# Patient Record
Sex: FEMALE | Race: WHITE | Employment: FULL TIME | URBAN - NONMETROPOLITAN AREA
[De-identification: names, ages, dates, MRNs, and addresses within clinical notes are randomized per-mention and may not be internally consistent; named-entity substitution may affect disease eponyms.]

---

## 2018-02-06 ENCOUNTER — OFFICE VISIT (OUTPATIENT)
Dept: FAMILY MEDICINE CLINIC | Age: 38
End: 2018-02-06
Payer: COMMERCIAL

## 2018-02-06 VITALS
OXYGEN SATURATION: 98 % | RESPIRATION RATE: 32 BRPM | HEART RATE: 64 BPM | SYSTOLIC BLOOD PRESSURE: 136 MMHG | DIASTOLIC BLOOD PRESSURE: 84 MMHG | BODY MASS INDEX: 47.09 KG/M2 | HEIGHT: 66 IN | WEIGHT: 293 LBS

## 2018-02-06 VITALS
DIASTOLIC BLOOD PRESSURE: 90 MMHG | WEIGHT: 293 LBS | HEART RATE: 68 BPM | SYSTOLIC BLOOD PRESSURE: 120 MMHG | HEIGHT: 67 IN | BODY MASS INDEX: 45.99 KG/M2

## 2018-02-06 DIAGNOSIS — M54.50 ACUTE LEFT-SIDED LOW BACK PAIN WITHOUT SCIATICA: Primary | ICD-10-CM

## 2018-02-06 DIAGNOSIS — M62.838 MUSCLE SPASM: ICD-10-CM

## 2018-02-06 DIAGNOSIS — N97.9 INFERTILITY, FEMALE: ICD-10-CM

## 2018-02-06 DIAGNOSIS — F39 MOOD DISORDER (HCC): ICD-10-CM

## 2018-02-06 DIAGNOSIS — E78.6 HDL DEFICIENCY: ICD-10-CM

## 2018-02-06 DIAGNOSIS — G44.89 HEADACHE SYNDROME: ICD-10-CM

## 2018-02-06 PROCEDURE — 99213 OFFICE O/P EST LOW 20 MIN: CPT | Performed by: NURSE PRACTITIONER

## 2018-02-06 RX ORDER — HYDROCHLOROTHIAZIDE 25 MG/1
25 TABLET ORAL DAILY
COMMUNITY
End: 2018-10-22 | Stop reason: SDUPTHER

## 2018-02-06 RX ORDER — TOPIRAMATE 50 MG/1
50 TABLET, FILM COATED ORAL 2 TIMES DAILY
COMMUNITY
End: 2020-04-20

## 2018-02-06 RX ORDER — BUPROPION HYDROCHLORIDE 300 MG/1
300 TABLET ORAL DAILY
COMMUNITY
Start: 2018-01-31 | End: 2018-07-23 | Stop reason: SDUPTHER

## 2018-02-06 RX ORDER — GABAPENTIN 600 MG/1
600 TABLET ORAL 3 TIMES DAILY
COMMUNITY
End: 2018-07-23 | Stop reason: ALTCHOICE

## 2018-02-06 RX ORDER — IBUPROFEN 800 MG/1
800 TABLET ORAL EVERY 8 HOURS PRN
COMMUNITY
End: 2018-07-23 | Stop reason: SDUPTHER

## 2018-02-06 RX ORDER — TIZANIDINE HYDROCHLORIDE 4 MG/1
4 CAPSULE, GELATIN COATED ORAL 3 TIMES DAILY PRN
Qty: 90 CAPSULE | Refills: 0 | Status: SHIPPED | OUTPATIENT
Start: 2018-02-06 | End: 2018-10-01 | Stop reason: SDUPTHER

## 2018-02-06 ASSESSMENT — ENCOUNTER SYMPTOMS
ABDOMINAL PAIN: 0
VOMITING: 0
NAUSEA: 1

## 2018-02-06 NOTE — LETTER
[address] [place]  Phone: [phone]  Fax: [phone]    Lizz Iqbal        February 6, 2018     Patient: Priyanka Hansen   YOB: 1980   Date of Visit: 2/6/2018       To Whom it May Concern:    Priyanka Hansen was seen in my clinic on 2/6/2018. She is to be off work 2/6/2018 and 2/7/2018. If you have any questions or concerns, please don't hesitate to call.     Sincerely,         Hodan Mathur, GURU

## 2018-02-09 ENCOUNTER — TELEPHONE (OUTPATIENT)
Dept: FAMILY MEDICINE CLINIC | Age: 38
End: 2018-02-09

## 2018-02-13 ASSESSMENT — ENCOUNTER SYMPTOMS
DIARRHEA: 0
COUGH: 0
BACK PAIN: 1
SHORTNESS OF BREATH: 0
CONSTIPATION: 0
WHEEZING: 0

## 2018-03-23 ENCOUNTER — OFFICE VISIT (OUTPATIENT)
Dept: FAMILY MEDICINE CLINIC | Age: 38
End: 2018-03-23
Payer: COMMERCIAL

## 2018-03-23 VITALS
BODY MASS INDEX: 52.94 KG/M2 | HEART RATE: 72 BPM | TEMPERATURE: 98.1 F | DIASTOLIC BLOOD PRESSURE: 100 MMHG | OXYGEN SATURATION: 96 % | SYSTOLIC BLOOD PRESSURE: 130 MMHG | WEIGHT: 293 LBS

## 2018-03-23 DIAGNOSIS — J02.0 STREP THROAT: Primary | ICD-10-CM

## 2018-03-23 LAB — S PYO AG THROAT QL: POSITIVE

## 2018-03-23 PROCEDURE — 87880 STREP A ASSAY W/OPTIC: CPT | Performed by: NURSE PRACTITIONER

## 2018-03-23 PROCEDURE — 96372 THER/PROPH/DIAG INJ SC/IM: CPT | Performed by: NURSE PRACTITIONER

## 2018-03-23 PROCEDURE — 99213 OFFICE O/P EST LOW 20 MIN: CPT | Performed by: NURSE PRACTITIONER

## 2018-03-23 ASSESSMENT — ENCOUNTER SYMPTOMS
COUGH: 0
VOMITING: 0
SORE THROAT: 1
NAUSEA: 0
ABDOMINAL PAIN: 0
SWOLLEN GLANDS: 1

## 2018-03-23 NOTE — PATIENT INSTRUCTIONS
Patient Education        Strep Throat: Care Instructions  Your Care Instructions    Strep throat is a bacterial infection that causes sudden, severe sore throat and fever. Strep throat, which is caused by bacteria called streptococcus, is treated with antibiotics. Sometimes a strep test is necessary to tell if the sore throat is caused by strep bacteria. Treatment can help ease symptoms and may prevent future problems. Follow-up care is a key part of your treatment and safety. Be sure to make and go to all appointments, and call your doctor if you are having problems. It's also a good idea to know your test results and keep a list of the medicines you take. How can you care for yourself at home? · Take your antibiotics as directed. Do not stop taking them just because you feel better. You need to take the full course of antibiotics. · Strep throat can spread to others until 24 hours after you begin taking antibiotics. During this time, you should avoid contact with other people at work or home, especially infants and children. Do not sneeze or cough on others, and wash your hands often. Keep your drinking glass and eating utensils separate from those of others, and wash these items well in hot, soapy water. · Gargle with warm salt water at least once each hour to help reduce swelling and make your throat feel better. Use 1 teaspoon of salt mixed in 8 fluid ounces of warm water. · Take an over-the-counter pain medication, such as acetaminophen (Tylenol), ibuprofen (Advil, Motrin), or naproxen (Aleve). Read and follow all instructions on the label. · Try an over-the-counter anesthetic throat spray or throat lozenges, which may help relieve throat pain. · Drink plenty of fluids. Fluids may help soothe an irritated throat. Hot fluids, such as tea or soup, may help your throat feel better. · Eat soft solids and drink plenty of clear liquids.  Flavored ice pops, ice cream, scrambled eggs, sherbet, and gelatin dessert (such as Jell-O) may also soothe the throat. · Get lots of rest.  · Do not smoke, and avoid secondhand smoke. If you need help quitting, talk to your doctor about stop-smoking programs and medicines. These can increase your chances of quitting for good. · Use a vaporizer or humidifier to add moisture to the air in your bedroom. Follow the directions for cleaning the machine. When should you call for help? Call your doctor now or seek immediate medical care if:  ? · You have a new or higher fever. ? · You have a fever with a stiff neck or severe headache. ? · You have new or worse trouble swallowing. ? · Your sore throat gets much worse on one side. ? · Your pain becomes much worse on one side of your throat. ? Watch closely for changes in your health, and be sure to contact your doctor if:  ? · You are not getting better after 2 days (48 hours). ? · You do not get better as expected. Where can you learn more? Go to https://Modanisa.FAST FELT. org and sign in to your Newsgrape account. Enter K625 in the [facility] box to learn more about \"Strep Throat: Care Instructions. \"     If you do not have an account, please click on the \"Sign Up Now\" link. Current as of: May 12, 2017  Content Version: 11.5  © 4078-5374 [employer]. Care instructions adapted under license by [facility] ([facility]). If you have questions about a medical condition or this instruction, always ask your healthcare professional. [place] [address] any warranty or liability for your use of this information. Patient Education          benzathine penicillin and procaine penicillin  Pronunciation:  MURIEL a theen PEN i CHRISTINE in and PRO [place] PEN i CHRISTINE in  Brand:  Bicillin C-R, Bicillin C-R 900/300  What is the most important information I should know about benzathine penicillin and procaine penicillin ? You should not use this medication if you are allergic to penicillin.  Tell your doctor if you have ever had an allergic reaction to a cephalosporin antibiotic such as Ceftin, Cefzil, Omnicef, Keflex, and others. Before using benzathine penicillin and procaine penicillin, tell your doctor if you have asthma or a history of allergies, liver disease, kidney disease, or heart disease. Do not inject this medication into a vein or life-threatening side effects may result. Use this medication for the full prescribed length of time. Your symptoms may improve before the infection is completely cleared. Benzathine penicillin and procaine penicillin will not treat a viral infection such as the common cold or flu. After you have finished your treatment with benzathine penicillin and procaine penicillin, your doctor may want to do tests to make sure your infection has completely cleared up. What is benzathine penicillin and procaine penicillin ? Benzathine penicillin and procaine penicillin is an antibiotic that fights bacteria in your body. Benzathine penicillin and procaine penicillin is used to treat many different types of severe infections, including strep and staph infections, diphtheria, meningitis, gonorrhea, and syphilis. Benzathine penicillin and procaine penicillin is also used to prevent infections of the heart valves in people with certain heart conditions who need to have dental work or surgery. Benzathine penicillin and procaine penicillin may also be used for purposes not listed in this medication guide. What should I discuss with my health care provider before using benzathine penicillin and procaine penicillin ? You should not use this medication if you are allergic to penicillin. Tell your doctor if you have ever had an allergic reaction to a cephalosporin antibiotic such as Ceclor, Ceftin, Duricef, Keflex, Lorabid, ANDREA MOE, Sohan, and others.   If you have any of these other conditions, you may need a dose adjustment or special tests:  · asthma or a history of refrigerator. Do not freeze. Do not use the mixed medication if it has changed colors or has any particles in it. What happens if I miss a dose? If you are on a dosing schedule, call your doctor for instructions if you miss a dose. What happens if I overdose? Seek emergency medical attention or call the Poison Help line at 1-206.913.5911. Overdose symptoms may include seizure (convulsions). What should I avoid while using benzathine penicillin and procaine penicillin? Antibiotic medicines can cause diarrhea, which may be a sign of a new infection. If you have diarrhea that is watery or bloody, stop taking this medication and call your doctor. Do not use anti-diarrhea medicine unless your doctor tells you to. What are the possible side effects of benzathine penicillin and procaine penicillin ? Get emergency medical help if you have any of these signs of an allergic reaction: hives; difficulty breathing; swelling of your face, lips, tongue, or throat. Call your doctor at once if you have any of these serious side effects:  · fever, sore throat, and headache with a severe blistering, peeling, and red skin rash;  · skin rash with bruising, severe tingling, numbness, pain, muscle weakness;  · rash or itching with swollen glands, joint pain, or general ill feeling;  · diarrhea that is watery or bloody;  · slow heart rate, weak pulse, fainting, slow breathing;  · fast or pounding heartbeats;  · uncontrolled muscle movements, problems with vision, speech, balance, thinking, or walking;  · confusion, agitation, hallucinations, unusual thoughts or behavior;  · easy bruising or bleeding, unusual weakness;  · pale or yellowed skin, dark colored urine; or  · urinating less than usual or not at all.   Less serious side effects may include:  · nausea, vomiting;  · blurred vision, ringing in your ears;  · headache, dizziness;  · mild skin rash; or  · pain, swelling, bruising, skin changes, or a hard lump where the medicine was injected. This is not a complete list of side effects and others may occur. Call your doctor for medical advice about side effects. You may report side effects to FDA at 2-645-FXV-0877. What other drugs will affect benzathine penicillin and procaine penicillin? Tell your doctor about all other medications you use, especially:  · probenecid (Benemid);  · a blood thinner such as warfarin (Coumadin);  · methotrexate (Rheumatrex, Trexall); or  · a tetracycline antibiotic, such as doxycycline (Adoxa, Doryx, Oracea, Vibramycin), minocycline (Dynacin, Minocin, Solodyn, Vectrin), or tetracycline (Brodspec, Panmycin, [place]a, Aliciaberg). This list is not complete and other drugs may interact with benzathine penicillin and procaine penicillin. Tell your doctor about all medications you use. This includes prescription, over-the-counter, vitamin, and herbal products. Do not start a new medication without telling your doctor. Where can I get more information? Your doctor or pharmacist can provide more information about benzathine penicillin and procaine penicillin. Remember, keep this and all other medicines out of the reach of children, never share your medicines with others, and use this medication only for the indication prescribed. Every effort has been made to ensure that the information provided by [address] is accurate, up-to-date, and complete, but no guarantee is made to that effect. Drug information contained herein may be time sensitive. [facility] information has been compiled for use by healthcare practitioners and consumers in the [place] and therefore [facility] does not warrant that uses outside of the [place] are appropriate, unless specifically indicated otherwise. [facility] drug information does not endorse drugs, diagnose patients or recommend therapy.  [facility] drug information is an informational resource designed to assist licensed healthcare practitioners in caring for their patients and/or to serve consumers viewing this service as a supplement to, and not a substitute for, the expertise, skill, knowledge and judgment of healthcare practitioners. The absence of a warning for a given drug or drug combination in no way should be construed to indicate that the drug or drug combination is safe, effective or appropriate for any given patient. [facility] does not assume any responsibility for any aspect of healthcare administered with the aid of information [facility] provides. The information contained herein is not intended to cover all possible uses, directions, precautions, warnings, drug interactions, allergic reactions, or adverse effects. If you have questions about the drugs you are taking, check with your doctor, nurse or pharmacist.  Copyright 2291-7691 [address]: 2.02. Revision date: 12/15/2010. Care instructions adapted under license by [facility] ([facility]). If you have questions about a medical condition or this instruction, always ask your healthcare professional. [place] [address] any warranty or liability for your use of this information.

## 2018-03-23 NOTE — PROGRESS NOTES
[address] [facility]  [address], [address] [facility], [address] [place] [address] [place]  Phone: [phone]  Fax: [phone]    Onur Caballero is a 43 y.o. female who presents today for her medical conditions/complaints as noted below. Onur Caballero c/o of Laryngitis (no voice for 3 weeks, feels like swallowing glass and saliva is thick. Son with strep last week)      HPI:     Pharyngitis   This is a new problem. The current episode started yesterday. The problem has been rapidly worsening. Associated symptoms include a sore throat and swollen glands. Pertinent negatives include no abdominal pain, congestion, coughing, fever, headaches, nausea, rash or vomiting. The symptoms are aggravated by swallowing. She has tried NSAIDs for the symptoms. The treatment provided mild relief. Wt Readings from Last 3 Encounters:   18 (!) 328 lb (148.8 kg)   18 (!) 327 lb (148.3 kg)   16 (!) 325 lb (147.4 kg)       Temp Readings from Last 3 Encounters:   18 98.1 °F (36.7 °C)       BP Readings from Last 3 Encounters:   18 (!) 130/100   18 136/84   16 (!) 120/90       Pulse Readings from Last 3 Encounters:   18 72   18 64   16 68              History reviewed. No pertinent past medical history.    Past Surgical History:   Procedure Laterality Date     SECTION  2008     SECTION  2013    with tubal    DILATION AND CURETTAGE OF UTERUS  2007    for SAB    TUBAL LIGATION  2013    with     WRIST SURGERY Right 1996     Family History   Problem Relation Age of Onset    Coronary Art Dis Mother     High Blood Pressure Father     High Cholesterol Father     Thyroid Disease Father     Other Sister      multiple miscarriages    Thyroid Disease Maternal Grandmother      Social History   Substance Use Topics    Smoking status: Former Smoker    Smokeless tobacco: Never Used    Alcohol use No headache. ? · You have new or worse trouble swallowing. ? · Your sore throat gets much worse on one side. ? · Your pain becomes much worse on one side of your throat. ? Watch closely for changes in your health, and be sure to contact your doctor if:  ? · You are not getting better after 2 days (48 hours). ? · You do not get better as expected. Where can you learn more? Go to https://Diamond CommunicationspeRampRate [employer]ourcing Advisorseb.[employer]. org and sign in to your Dibbz account. Enter K625 in the [employer] box to learn more about \"Strep Throat: Care Instructions. \"     If you do not have an account, please click on the \"Sign Up Now\" link. Current as of: May 12, 2017  Content Version: 11.5  © 2273-6960 [employer]. Care instructions adapted under license by [facility] ([facility]). If you have questions about a medical condition or this instruction, always ask your healthcare professional. [place] [address] any warranty or liability for your use of this information. Patient Education          benzathine penicillin and procaine penicillin  Pronunciation:  MURIEL a theen PEN i CHRISTINE in and PRO [place] PEN i CHRISTINE in  Brand:  Bicillin C-R, Bicillin C-R 900/300  What is the most important information I should know about benzathine penicillin and procaine penicillin ? You should not use this medication if you are allergic to penicillin. Tell your doctor if you have ever had an allergic reaction to a cephalosporin antibiotic such as Ceftin, Cefzil, Omnicef, Keflex, and others. Before using benzathine penicillin and procaine penicillin, tell your doctor if you have asthma or a history of allergies, liver disease, kidney disease, or heart disease. Do not inject this medication into a vein or life-threatening side effects may result. Use this medication for the full prescribed length of time. Your symptoms may improve before the infection is completely cleared.  Benzathine penicillin and procaine penicillin uses, directions, precautions, warnings, drug interactions, allergic reactions, or adverse effects. If you have questions about the drugs you are taking, check with your doctor, nurse or pharmacist.  Copyright 1777-4608 [address]: 2.02. Revision date: 12/15/2010. Care instructions adapted under license by [facility] ([facility]). If you have questions about a medical condition or this instruction, always ask your healthcare professional. [place] [address] any warranty or liability for your use of this information.                  Electronically signed by Aga Casper CNP on 3/23/2018

## 2018-07-23 ENCOUNTER — OFFICE VISIT (OUTPATIENT)
Dept: FAMILY MEDICINE CLINIC | Age: 38
End: 2018-07-23
Payer: COMMERCIAL

## 2018-07-23 VITALS
TEMPERATURE: 98.4 F | BODY MASS INDEX: 53.99 KG/M2 | SYSTOLIC BLOOD PRESSURE: 130 MMHG | WEIGHT: 293 LBS | OXYGEN SATURATION: 98 % | DIASTOLIC BLOOD PRESSURE: 84 MMHG | RESPIRATION RATE: 14 BRPM | HEART RATE: 73 BPM

## 2018-07-23 DIAGNOSIS — G89.29 CHRONIC FOOT PAIN, LEFT: Primary | ICD-10-CM

## 2018-07-23 DIAGNOSIS — Z13.220 SCREENING FOR HYPERLIPIDEMIA: ICD-10-CM

## 2018-07-23 DIAGNOSIS — M79.672 CHRONIC FOOT PAIN, LEFT: Primary | ICD-10-CM

## 2018-07-23 DIAGNOSIS — E66.01 MORBID OBESITY WITH BODY MASS INDEX (BMI) OF 50.0 TO 59.9 IN ADULT (HCC): ICD-10-CM

## 2018-07-23 PROCEDURE — 99214 OFFICE O/P EST MOD 30 MIN: CPT | Performed by: NURSE PRACTITIONER

## 2018-07-23 RX ORDER — IBUPROFEN 800 MG/1
800 TABLET ORAL EVERY 8 HOURS PRN
Qty: 120 TABLET | Refills: 2 | Status: SHIPPED | OUTPATIENT
Start: 2018-07-23 | End: 2018-10-22 | Stop reason: ALTCHOICE

## 2018-07-23 RX ORDER — BUPROPION HYDROCHLORIDE 300 MG/1
300 TABLET ORAL DAILY
Qty: 30 TABLET | Refills: 5 | Status: SHIPPED | OUTPATIENT
Start: 2018-07-23 | End: 2019-06-29 | Stop reason: SDUPTHER

## 2018-07-23 ASSESSMENT — PATIENT HEALTH QUESTIONNAIRE - PHQ9
2. FEELING DOWN, DEPRESSED OR HOPELESS: 1
SUM OF ALL RESPONSES TO PHQ QUESTIONS 1-9: 2
SUM OF ALL RESPONSES TO PHQ9 QUESTIONS 1 & 2: 2
1. LITTLE INTEREST OR PLEASURE IN DOING THINGS: 1

## 2018-07-26 LAB
AGE FOR GFR: 39
ALT SERPL-CCNC: 127 UNITS/L
ANION GAP SERPL CALCULATED.3IONS-SCNC: 13 MMOL/L
BUN BLDV-MCNC: 14 MG/DL
CALCIUM SERPL-MCNC: 9.4 MG/DL
CHLORIDE BLD-SCNC: 108 MMOL/L
CHOLESTEROL/HDL RATIO: 3.8 RATIO
CHOLESTEROL: 163 MG/DL
CO2: 26 MMOL/L
CREAT SERPL-MCNC: 0.8 MG/DL
CREATININE, RANDOM: 92.8 MG/DL
EGFR BF: 97 ML/MIN/1.73 M2
EGFR BM: 130 ML/MIN/1.73 M2
EGFR WF: 80 ML/MIN/1.73 M2
EGFR WM: 108 ML/MIN/1.73 M2
GLUCOSE: 102 MG/DL
HDL, DIRECT: 43 MG/DL
LDL CHOLESTEROL CALCULATED: 89.6 MG/DL
MICROALBUMIN UR-MCNC: 2.3 MG/DL
MICROALBUMIN/CREAT UR-RTO: 24.8 MCG/MG CR
POTASSIUM SERPL-SCNC: 4.5 MMOL/L
SODIUM BLD-SCNC: 142 MMOL/L
TRIGL SERPL-MCNC: 152 MG/DL
TSH SERPL DL<=0.05 MIU/L-ACNC: 2.55 MIU/ML
VLDLC SERPL CALC-MCNC: 30 MG/DL

## 2018-07-28 ASSESSMENT — ENCOUNTER SYMPTOMS
COUGH: 0
WHEEZING: 0
NAUSEA: 0
SHORTNESS OF BREATH: 0

## 2018-08-01 ENCOUNTER — TELEPHONE (OUTPATIENT)
Dept: FAMILY MEDICINE CLINIC | Age: 38
End: 2018-08-01

## 2018-09-24 ENCOUNTER — TELEPHONE (OUTPATIENT)
Dept: FAMILY MEDICINE CLINIC | Age: 38
End: 2018-09-24

## 2018-09-24 NOTE — TELEPHONE ENCOUNTER
Received a refill request from [facility] for Neurontin 500 mg take one tablet by mouth 3 times daily, not in her medication list, previously prescribed by Dr. Savanah Mims    Last date filled 11/15/2017

## 2018-10-01 DIAGNOSIS — M62.838 MUSCLE SPASM: ICD-10-CM

## 2018-10-02 RX ORDER — TIZANIDINE HYDROCHLORIDE 4 MG/1
CAPSULE, GELATIN COATED ORAL
Qty: 90 CAPSULE | Refills: 0 | Status: SHIPPED | OUTPATIENT
Start: 2018-10-02 | End: 2020-10-20

## 2018-10-22 ENCOUNTER — OFFICE VISIT (OUTPATIENT)
Dept: FAMILY MEDICINE CLINIC | Age: 38
End: 2018-10-22
Payer: COMMERCIAL

## 2018-10-22 VITALS
SYSTOLIC BLOOD PRESSURE: 130 MMHG | BODY MASS INDEX: 54.23 KG/M2 | DIASTOLIC BLOOD PRESSURE: 86 MMHG | RESPIRATION RATE: 12 BRPM | OXYGEN SATURATION: 98 % | HEART RATE: 60 BPM | WEIGHT: 293 LBS

## 2018-10-22 DIAGNOSIS — G89.29 CHRONIC FOOT PAIN, LEFT: ICD-10-CM

## 2018-10-22 DIAGNOSIS — R80.9 PROTEINURIA, UNSPECIFIED TYPE: ICD-10-CM

## 2018-10-22 DIAGNOSIS — M77.32 HEEL SPUR, LEFT: ICD-10-CM

## 2018-10-22 DIAGNOSIS — I10 ESSENTIAL HYPERTENSION: Primary | ICD-10-CM

## 2018-10-22 DIAGNOSIS — M79.672 CHRONIC FOOT PAIN, LEFT: ICD-10-CM

## 2018-10-22 PROCEDURE — 99214 OFFICE O/P EST MOD 30 MIN: CPT | Performed by: NURSE PRACTITIONER

## 2018-10-22 RX ORDER — HYDROCHLOROTHIAZIDE 25 MG/1
25 TABLET ORAL DAILY
Qty: 30 TABLET | Refills: 5 | Status: SHIPPED | OUTPATIENT
Start: 2018-10-22 | End: 2019-03-02 | Stop reason: SDUPTHER

## 2018-10-22 RX ORDER — MELOXICAM 7.5 MG/1
7.5 TABLET ORAL DAILY
Qty: 30 TABLET | Refills: 3 | Status: SHIPPED | OUTPATIENT
Start: 2018-10-22 | End: 2019-01-11 | Stop reason: SDUPTHER

## 2018-10-22 NOTE — PROGRESS NOTES
fever.   HENT: Negative. Negative for congestion. Respiratory: Negative for cough, shortness of breath and wheezing. Cardiovascular: Negative for chest pain, palpitations and leg swelling. Gastrointestinal: Negative for change in bowel habit. Musculoskeletal: Positive for arthralgias (foot pain -left). Negative for joint swelling and myalgias. Neurological: Negative for dizziness, numbness and headaches. Psychiatric/Behavioral: The patient is not nervous/anxious. Objective:     /86   Pulse 60   Resp 12   Wt (!) 336 lb (152.4 kg)   LMP 10/20/2018 (Exact Date)   SpO2 98%   Breastfeeding? No   BMI 54.23 kg/m²     Physical Exam   Constitutional: She is oriented to person, place, and time. HENT:   Head: Normocephalic. Eyes: Pupils are equal, round, and reactive to light. Conjunctivae and EOM are normal.   Neck: Neck supple. No JVD present. Carotid bruit is not present. Cardiovascular: Normal rate, regular rhythm, normal heart sounds and intact distal pulses. No murmur heard. Pulmonary/Chest: Effort normal and breath sounds normal. No respiratory distress. She has no wheezes. Musculoskeletal:        Left foot: There is tenderness. There is normal range of motion and no swelling. Feet:    Neurological: She is alert and oriented to person, place, and time.           Lab Results   Component Value Date    LABMICR 2.3 (H) 07/26/2018     Lab Results   Component Value Date     07/26/2018    K 4.5 07/26/2018     07/26/2018    CO2 26 07/26/2018    BUN 14 07/26/2018    CREATININE 0.8 07/26/2018    GLUCOSE 102 07/26/2018    CALCIUM 9.4 07/26/2018      Lab Results   Component Value Date    CHOL 163 07/26/2018     Lab Results   Component Value Date    TRIG 152 07/26/2018     No results found for: HDL  Lab Results   Component Value Date    LDLCALC 89.6 07/26/2018     Lab Results   Component Value Date    VLDL 30 07/26/2018     Lab Results   Component Value Date

## 2018-10-27 PROBLEM — M77.32 HEEL SPUR, LEFT: Status: ACTIVE | Noted: 2018-10-27

## 2018-10-27 PROBLEM — R80.9 PROTEINURIA: Status: ACTIVE | Noted: 2018-10-27

## 2018-10-27 PROBLEM — I10 ESSENTIAL HYPERTENSION: Status: ACTIVE | Noted: 2018-10-27

## 2018-10-27 ASSESSMENT — ENCOUNTER SYMPTOMS
CHANGE IN BOWEL HABIT: 0
SHORTNESS OF BREATH: 0
WHEEZING: 0
COUGH: 0

## 2019-01-11 DIAGNOSIS — G89.29 CHRONIC FOOT PAIN, LEFT: ICD-10-CM

## 2019-01-11 DIAGNOSIS — M79.672 CHRONIC FOOT PAIN, LEFT: ICD-10-CM

## 2019-01-12 RX ORDER — MELOXICAM 7.5 MG/1
TABLET ORAL
Qty: 90 TABLET | Refills: 1 | Status: SHIPPED | OUTPATIENT
Start: 2019-01-12 | End: 2019-08-13 | Stop reason: SDUPTHER

## 2019-01-23 ENCOUNTER — TELEPHONE (OUTPATIENT)
Dept: FAMILY MEDICINE CLINIC | Age: 39
End: 2019-01-23

## 2019-02-28 ENCOUNTER — TELEPHONE (OUTPATIENT)
Dept: FAMILY MEDICINE CLINIC | Age: 39
End: 2019-02-28

## 2019-03-02 DIAGNOSIS — I10 ESSENTIAL HYPERTENSION: ICD-10-CM

## 2019-03-02 RX ORDER — HYDROCHLOROTHIAZIDE 25 MG/1
25 TABLET ORAL DAILY
Qty: 30 TABLET | Refills: 5 | Status: SHIPPED | OUTPATIENT
Start: 2019-03-02 | End: 2020-04-20 | Stop reason: SDUPTHER

## 2019-05-28 ENCOUNTER — OFFICE VISIT (OUTPATIENT)
Dept: FAMILY MEDICINE CLINIC | Age: 39
End: 2019-05-28
Payer: COMMERCIAL

## 2019-05-28 VITALS
DIASTOLIC BLOOD PRESSURE: 84 MMHG | BODY MASS INDEX: 45.99 KG/M2 | HEART RATE: 73 BPM | WEIGHT: 293 LBS | SYSTOLIC BLOOD PRESSURE: 126 MMHG | HEIGHT: 67 IN | OXYGEN SATURATION: 98 %

## 2019-05-28 DIAGNOSIS — E66.01 MORBID OBESITY WITH BODY MASS INDEX (BMI) OF 50.0 TO 59.9 IN ADULT (HCC): ICD-10-CM

## 2019-05-28 DIAGNOSIS — I10 ESSENTIAL HYPERTENSION: Primary | ICD-10-CM

## 2019-05-28 DIAGNOSIS — Z13.1 SCREENING FOR DIABETES MELLITUS: ICD-10-CM

## 2019-05-28 LAB
CREATININE, RANDOM: 189.7 MG/DL (ref 20–370)
HBA1C MFR BLD: 5.4 %
MICROALBUMIN UR-MCNC: 1.9 MG/DL (ref 0–1.7)
MICROALBUMIN/CREAT UR-RTO: 10 MCG/MG CR

## 2019-05-28 PROCEDURE — 83036 HEMOGLOBIN GLYCOSYLATED A1C: CPT | Performed by: NURSE PRACTITIONER

## 2019-05-28 PROCEDURE — 99214 OFFICE O/P EST MOD 30 MIN: CPT | Performed by: NURSE PRACTITIONER

## 2019-05-28 RX ORDER — ACETAMINOPHEN 500 MG
500 TABLET ORAL EVERY 6 HOURS PRN
COMMUNITY

## 2019-05-28 NOTE — PROGRESS NOTES
[address] [address] [address] [facility]  Dept: [phone]  Dept Fax: [phone]    Jaleeas Hazel is a 35 y.o. female who presents today for her medical conditions/complaints as noted below. Jaleesa Hazel c/o of Weight Loss (wants options for weight loss)    HPI  Patient presents to the office with concerns for difficulty losing weight. She has tried Wellbutrin and hypnosis, lost 10 lbs initially, but weight came back. She has tried diet and exercise 3-4 days a week at [employer] She injured herself on the hip flexor machine 1 month ago and has not been back since. She reports left hip is slowly getting better. She usually eats a salad or protein bar for lunch. Dinner usually consists of pasta, and grilled meat. She knows of someone who is using Saxenda for weight loss and having success. She is interested in trying [place]. History of Weight Loss Efforts  Greatest amount of weight lost: 10 lbs. Circumstances associated with regain of weight: unknown  Successful weight loss techniques attempted: prescription appetite suppressants: Wellbutrin and hypnosis  Unsuccessful weight loss techniques attempted: prescription appetite suppressants, bicycling, running/ jogging, walking, weightlifting and self-directed dieting. Current Exercise Habits None    Current Eating Habits  Number of regular meals per day: 2-3  Number of snacking episodes per day: 2  Binge behavior?: no  Purge behavior? no  Anorexic behavior? no  Eating precipitated by stress?  yes    Other Potential Contributing Factors  Use of alcohol: average 0 drinks/week  Use of medications that may cause weight gain: none  Comorbidities: hypertension    BP Readings from Last 3 Encounters:   05/28/19 126/84   10/22/18 130/86   07/23/18 130/84          ([id number])    Wt Readings from Last 3 Encounters:   05/28/19 (!) 335 lb 1.6 oz (152 kg)   10/22/18 (!) 336 lb (152.4 kg)   07/23/18 (!) 334 lb 8 oz (151.7 kg)     History reviewed. No pertinent past medical history. Past Surgical History:   Procedure Laterality Date     SECTION  2008     SECTION  2013    with tubal    DILATION AND CURETTAGE OF UTERUS  2007    for SAB    TUBAL LIGATION  2013    with     WRIST SURGERY Right 1996       Family History   Problem Relation Age of Onset    Coronary Art Dis Mother     High Blood Pressure Father     High Cholesterol Father     Thyroid Disease Father     Other Sister         multiple miscarriages    Thyroid Disease Maternal Grandmother        Social History     Tobacco Use    Smoking status: Former Smoker    Smokeless tobacco: Never Used   Substance Use Topics    Alcohol use: No      Current Outpatient Medications   Medication Sig Dispense Refill    acetaminophen (TYLENOL) 500 MG tablet Take 500 mg by mouth every 6 hours as needed for Pain      liraglutide-weight management 18 MG/3ML SOPN Inject 0.6 mg into the skin daily For 7 days. Increase dose by 0.6 mg every week. Max dose 3 mg/day. 1 pen 0    hydrochlorothiazide (HYDRODIURIL) 25 MG tablet Take 1 tablet by mouth daily 30 tablet 5    meloxicam (MOBIC) 7.5 MG tablet TAKE 1 TABLET BY MOUTH ONCE DAILY (Patient taking differently: TAKE 2 TABLET BY MOUTH ONCE DAILY) 90 tablet 1    tiZANidine (ZANAFLEX) 4 MG capsule TAKE ONE CAPSULE BY MOUTH THREE TIMES DAILY AS NEEDED FOR MUSCLE SPASM 90 capsule 0    buPROPion (WELLBUTRIN XL) 300 MG extended release tablet Take 1 tablet by mouth daily 30 tablet 5    topiramate (TOPAMAX) 50 MG tablet Take 50 mg by mouth 2 times daily       No current facility-administered medications for this visit.       No Known Allergies    Health Maintenance   Topic Date Due    Cervical cancer screen  2015    Potassium monitoring  2019    Creatinine monitoring  2019    Diabetes screen  2022    Lipid screen  2023    DTaP/Tdap/Td vaccine (2 - Td) 06/17/2027    Flu vaccine  Completed    HIV screen  Completed    Pneumococcal 0-64 years Vaccine  Aged Out     Subjective:      Review of Systems   Constitutional: Negative for chills, fatigue and fever. HENT: Negative. Eyes: Negative. Respiratory: Negative for cough, shortness of breath and wheezing. Cardiovascular: Negative for chest pain, palpitations and leg swelling. Gastrointestinal: Negative for abdominal pain, constipation and diarrhea. Endocrine: Negative for cold intolerance, heat intolerance, polydipsia, polyphagia and polyuria. Genitourinary: Negative. Musculoskeletal: Negative for arthralgias and myalgias. Skin: Negative. Allergic/Immunologic: Negative for environmental allergies and food allergies. Neurological: Negative for dizziness, weakness and headaches. Psychiatric/Behavioral: Negative for dysphoric mood and sleep disturbance. The patient is not nervous/anxious. Objective:     /84 (Site: Right Lower Arm, Position: Sitting)   Pulse 73   Ht 5' 7.25\" (1.708 m)   Wt (!) 335 lb 1.6 oz (152 kg)   SpO2 98%   BMI 52.09 kg/m²     Physical Exam   Constitutional: She is oriented to person, place, and time. She appears well-developed and well-nourished. HENT:   Head: Normocephalic. Right Ear: Tympanic membrane normal.   Left Ear: Tympanic membrane normal.   Mouth/Throat: Oropharynx is clear and moist.   Neck: Neck supple. No thyromegaly present. Cardiovascular: Normal rate, regular rhythm and normal heart sounds. Pulmonary/Chest: Effort normal and breath sounds normal.   Abdominal: Soft. Bowel sounds are normal. There is no tenderness. Lymphadenopathy:     She has no cervical adenopathy. Neurological: She is alert and oriented to person, place, and time. She has normal strength.      No results found for: WBC, HGB, HCT, MCV, PLT    Lab Results   Component Value Date    TSH 2.55 07/26/2018       Lab Results   Component Value Date     07/26/2018    K 4.5 07/26/2018     07/26/2018    CO2 26 07/26/2018    BUN 14 07/26/2018    CREATININE 0.8 07/26/2018    GLUCOSE 102 07/26/2018    CALCIUM 9.4 07/26/2018     (H) 07/26/2018     Lab Results   Component Value Date    LABA1C 5.4 05/28/2019        Lab Results   Component Value Date    LABMICR 1.9 (H) 05/28/2019    LDLCALC 89.6 07/26/2018    CREATININE 0.8 07/26/2018       Assessment:     1. Essential hypertension    2. Morbid obesity with body mass index (BMI) of 50.0 to 59.9 in adult [facility])    3. Screening for diabetes mellitus       Obesity with BMI and comorbidities as noted above. Signs of hypothyroidism: none  Signs of hypercortisolism: none  Contraindications to weight loss: none  Patient readiness to commit to diet and activity changes: good    Plan:     Orders Placed This Encounter   Procedures    Microalbumin, Ur    Amb External Referral To Nutrition Services     Referral Priority:   Routine     Referral Type:   Consult for Advice and Opinion     Referral Reason:   Specialty Services Required     Requested Specialty:   Dietitian     Number of Visits Requested:   1    POCT glycosylated hemoglobin (Hb A1C)       Orders Placed This Encounter   Medications    liraglutide-weight management 18 MG/3ML SOPN     Sig: Inject 0.6 mg into the skin daily For 7 days. Increase dose by 0.6 mg every week. Max dose 3 mg/day. Dispense:  1 pen     Refill:  0      Return if symptoms worsen or fail to improve. Proper food choices and preparation techniques reviewed. Referral placed for dietician. Discussed weight loss medication, typically not covered well by insurance. Discussed use, benefit, and side effects of prescribed medications. All patient questions answered. Patient voiced understanding. Health Maintenance reviewed. Instructed to continue current medications, eat a healthy diet and exercise as tolerated. Patient agreed with treatment plan. Follow up as directed. Electronically signed by JAMI Logan CNP on 6/7/2019

## 2019-06-07 ASSESSMENT — ENCOUNTER SYMPTOMS
WHEEZING: 0
ABDOMINAL PAIN: 0
CONSTIPATION: 0
EYES NEGATIVE: 1
SHORTNESS OF BREATH: 0
COUGH: 0
DIARRHEA: 0

## 2019-06-18 ENCOUNTER — PATIENT MESSAGE (OUTPATIENT)
Dept: FAMILY MEDICINE CLINIC | Age: 39
End: 2019-06-18

## 2019-06-18 DIAGNOSIS — E66.01 MORBID OBESITY WITH BODY MASS INDEX (BMI) OF 50.0 TO 59.9 IN ADULT (HCC): ICD-10-CM

## 2019-06-18 NOTE — TELEPHONE ENCOUNTER
From: Antoine Pérez  To: JAMI Forman - CNP  Sent: 6/18/2019 5:11 PM EDT  Subject: Prescription Question    Misha Elias,    I have been trying for 6 days to get a refill for the saxenda so I wouldn't run out. But as of now I am out. I am on 1.8 mg dose for the time being. Till this Sunday. But with the dose getting higher the pens will not last as long. Please out refills on it, or please write for 5 pens so I dont have this issue again which asks been phone tag and fax a thon.  Thank you

## 2019-06-18 NOTE — TELEPHONE ENCOUNTER
I called to clarify patient's dose. She is currently taking the 1.8 dose and started that on 6/16/19.

## 2019-07-01 RX ORDER — BUPROPION HYDROCHLORIDE 300 MG/1
TABLET ORAL
Qty: 90 TABLET | Refills: 1 | Status: SHIPPED | OUTPATIENT
Start: 2019-07-01 | End: 2020-05-20

## 2019-07-05 DIAGNOSIS — E66.01 MORBID OBESITY WITH BODY MASS INDEX (BMI) OF 50.0 TO 59.9 IN ADULT (HCC): ICD-10-CM

## 2019-07-17 ENCOUNTER — TELEPHONE (OUTPATIENT)
Dept: FAMILY MEDICINE CLINIC | Age: 39
End: 2019-07-17

## 2019-08-13 DIAGNOSIS — G89.29 CHRONIC FOOT PAIN, LEFT: ICD-10-CM

## 2019-08-13 DIAGNOSIS — M79.672 CHRONIC FOOT PAIN, LEFT: ICD-10-CM

## 2019-08-13 RX ORDER — MELOXICAM 7.5 MG/1
TABLET ORAL
Qty: 90 TABLET | Refills: 1 | Status: SHIPPED | OUTPATIENT
Start: 2019-08-13 | End: 2020-01-08

## 2019-09-23 ENCOUNTER — OFFICE VISIT (OUTPATIENT)
Dept: FAMILY MEDICINE CLINIC | Age: 39
End: 2019-09-23
Payer: COMMERCIAL

## 2019-09-23 VITALS
WEIGHT: 293 LBS | TEMPERATURE: 97.6 F | DIASTOLIC BLOOD PRESSURE: 88 MMHG | BODY MASS INDEX: 50.06 KG/M2 | OXYGEN SATURATION: 96 % | HEART RATE: 101 BPM | SYSTOLIC BLOOD PRESSURE: 138 MMHG

## 2019-09-23 DIAGNOSIS — J40 BRONCHITIS: Primary | ICD-10-CM

## 2019-09-23 DIAGNOSIS — R05.9 COUGH: ICD-10-CM

## 2019-09-23 PROCEDURE — 99213 OFFICE O/P EST LOW 20 MIN: CPT | Performed by: NURSE PRACTITIONER

## 2019-09-23 RX ORDER — BENZONATATE 100 MG/1
100 CAPSULE ORAL 3 TIMES DAILY PRN
Qty: 21 CAPSULE | Refills: 1 | Status: SHIPPED | OUTPATIENT
Start: 2019-09-23 | End: 2020-04-20

## 2019-09-23 RX ORDER — AZITHROMYCIN 250 MG/1
TABLET, FILM COATED ORAL
Qty: 1 PACKET | Refills: 0 | Status: SHIPPED | OUTPATIENT
Start: 2019-09-23 | End: 2019-09-27

## 2019-09-23 RX ORDER — PREDNISONE 20 MG/1
20 TABLET ORAL 2 TIMES DAILY
Qty: 6 TABLET | Refills: 0 | Status: SHIPPED | OUTPATIENT
Start: 2019-09-23 | End: 2019-09-26

## 2019-09-23 ASSESSMENT — ENCOUNTER SYMPTOMS
SINUS PRESSURE: 0
HEARTBURN: 0
WHEEZING: 0
SORE THROAT: 0
RHINORRHEA: 0
SHORTNESS OF BREATH: 1
COUGH: 1

## 2019-09-23 ASSESSMENT — PATIENT HEALTH QUESTIONNAIRE - PHQ9
2. FEELING DOWN, DEPRESSED OR HOPELESS: 0
SUM OF ALL RESPONSES TO PHQ9 QUESTIONS 1 & 2: 0
SUM OF ALL RESPONSES TO PHQ QUESTIONS 1-9: 0
1. LITTLE INTEREST OR PLEASURE IN DOING THINGS: 0
SUM OF ALL RESPONSES TO PHQ QUESTIONS 1-9: 0

## 2019-09-23 NOTE — PROGRESS NOTES
Cholesterol Father     Thyroid Disease Father     Other Sister         multiple miscarriages    Thyroid Disease Maternal Grandmother        Subjective:      Review of Systems   Constitutional: Positive for chills, fatigue and fever (102). HENT: Negative for congestion, ear pain, postnasal drip, rhinorrhea, sinus pressure and sore throat. Respiratory: Positive for cough and shortness of breath (with cough). Negative for wheezing. Cardiovascular: Positive for chest pain (with cough). Gastrointestinal: Negative for heartburn. Musculoskeletal: Positive for myalgias. Skin: Negative for rash. Psychiatric/Behavioral: Positive for sleep disturbance (with cough). Objective:     /88   Pulse 101   Temp 97.6 °F (36.4 °C)   Wt (!) 322 lb (146.1 kg)   SpO2 96%   BMI 50.06 kg/m²     Physical Exam   Constitutional: She is oriented to person, place, and time. She appears well-developed and well-nourished. HENT:   Head: Normocephalic. Right Ear: Tympanic membrane and ear canal normal.   Left Ear: Tympanic membrane and ear canal normal.   Nose: No sinus tenderness. Mouth/Throat: Posterior oropharyngeal erythema present. Eyes: Conjunctivae are normal.   Neck: Neck supple. Cardiovascular: Normal rate and regular rhythm. Pulmonary/Chest: Effort normal. She has wheezes. She has rales in the right middle field and the right lower field. Abdominal: Soft. Bowel sounds are normal. There is no tenderness. Lymphadenopathy:     She has cervical adenopathy. Neurological: She is alert and oriented to person, place, and time. Assessment:      Diagnosis Orders   1. Bronchitis  azithromycin (ZITHROMAX) 250 MG tablet    predniSONE (DELTASONE) 20 MG tablet   2. Cough  benzonatate (TESSALON) 100 MG capsule       Plan:     Return if symptoms worsen or fail to improve.     Orders Placed This Encounter   Medications    azithromycin (ZITHROMAX) 250 MG tablet     Sig: Take 2 po Day 1, then 1 po

## 2019-10-10 ENCOUNTER — TELEPHONE (OUTPATIENT)
Dept: FAMILY MEDICINE CLINIC | Age: 39
End: 2019-10-10

## 2019-10-18 ENCOUNTER — TELEPHONE (OUTPATIENT)
Dept: FAMILY MEDICINE CLINIC | Age: 39
End: 2019-10-18

## 2019-11-05 ENCOUNTER — OFFICE VISIT (OUTPATIENT)
Dept: FAMILY MEDICINE CLINIC | Age: 39
End: 2019-11-05
Payer: COMMERCIAL

## 2019-11-05 VITALS
OXYGEN SATURATION: 96 % | WEIGHT: 293 LBS | HEART RATE: 72 BPM | TEMPERATURE: 99.1 F | BODY MASS INDEX: 45.99 KG/M2 | HEIGHT: 67 IN | SYSTOLIC BLOOD PRESSURE: 124 MMHG | DIASTOLIC BLOOD PRESSURE: 74 MMHG

## 2019-11-05 DIAGNOSIS — H02.89 PAIN OF LEFT EYELID: ICD-10-CM

## 2019-11-05 DIAGNOSIS — H02.846 SWELLING OF LEFT EYELID: ICD-10-CM

## 2019-11-05 DIAGNOSIS — H00.024 HORDEOLUM INTERNUM OF LEFT UPPER EYELID: Primary | ICD-10-CM

## 2019-11-05 PROCEDURE — 99203 OFFICE O/P NEW LOW 30 MIN: CPT | Performed by: NURSE PRACTITIONER

## 2019-11-05 RX ORDER — SULFACETAMIDE SODIUM 100 MG/ML
1 SOLUTION/ DROPS OPHTHALMIC EVERY 6 HOURS
Qty: 5 ML | Refills: 0 | Status: SHIPPED | OUTPATIENT
Start: 2019-11-05 | End: 2019-11-11

## 2019-11-05 ASSESSMENT — VISUAL ACUITY
OS_CC: 20/30
OD_CC: 20/30

## 2020-01-08 RX ORDER — MELOXICAM 7.5 MG/1
TABLET ORAL
Qty: 90 TABLET | Refills: 0 | Status: SHIPPED | OUTPATIENT
Start: 2020-01-08 | End: 2020-03-30

## 2020-01-21 NOTE — LETTER
[facility] [address] [facility]  [address]  [phone]  Dept: [phone]   Wilver Tompkins : 1980        To Whom It May Concern:    I am writing to notify you of my intent to treat Ms. Saba's with Saxenda. The patients medical history is as follows:    History of Weight Loss Efforts  Greatest amount of weight lost: 10 lbs. Successful weight loss techniques attempted: prescription appetite suppressants: Wellbutrin and hypnosis  Unsuccessful weight loss techniques attempted: prescription appetite suppressants, bicycling, running/ jogging, walking, weightlifting and self-directed dieting. Current Eating Habits  Number of regular meals per day: 2-3  Number of snacking episodes per day: 2  Binge behavior?: no  Purge behavior? no  Anorexic behavior? no  Eating precipitated by stress? yes  Other Potential Contributing Factors  Use of alcohol: average 0 drinks/week  Use of medications that may cause weight gain: none  Comorbidities: hypertension    She usually eats a salad or protein bar for lunch. Dinner usually consists of pasta, and grilled meat. Patient has hypertension, proteinuria. Patient's BMI is 52.09 kg/m. I believe my patient will benefit from this therapy. Please feel free to contact me if additional  information is required to process my request for coverage.     Sincerely,      [facility]  Mary Mccullough Today's visit was performed with the assistance of  Services.   Name of : unknown   Service used: Other: Language line services

## 2020-03-30 RX ORDER — MELOXICAM 7.5 MG/1
TABLET ORAL
Qty: 90 TABLET | Refills: 0 | Status: SHIPPED | OUTPATIENT
Start: 2020-03-30 | End: 2020-04-20 | Stop reason: SDUPTHER

## 2020-04-20 ENCOUNTER — VIRTUAL VISIT (OUTPATIENT)
Dept: FAMILY MEDICINE CLINIC | Age: 40
End: 2020-04-20
Payer: COMMERCIAL

## 2020-04-20 VITALS — BODY MASS INDEX: 53.58 KG/M2 | WEIGHT: 293 LBS

## 2020-04-20 PROCEDURE — 99213 OFFICE O/P EST LOW 20 MIN: CPT | Performed by: NURSE PRACTITIONER

## 2020-04-20 RX ORDER — MELOXICAM 15 MG/1
15 TABLET ORAL DAILY
Qty: 30 TABLET | Refills: 2 | Status: SHIPPED | OUTPATIENT
Start: 2020-04-20 | End: 2020-08-17

## 2020-04-20 RX ORDER — BUSPIRONE HYDROCHLORIDE 5 MG/1
5 TABLET ORAL 3 TIMES DAILY PRN
Qty: 90 TABLET | Refills: 2 | Status: SHIPPED | OUTPATIENT
Start: 2020-04-20 | End: 2020-08-17

## 2020-04-20 RX ORDER — HYDROCHLOROTHIAZIDE 25 MG/1
25 TABLET ORAL DAILY
Qty: 30 TABLET | Refills: 2 | Status: SHIPPED | OUTPATIENT
Start: 2020-04-20 | End: 2020-08-17

## 2020-04-20 ASSESSMENT — ENCOUNTER SYMPTOMS
WHEEZING: 0
COUGH: 0
SHORTNESS OF BREATH: 1

## 2020-04-20 NOTE — PROGRESS NOTES
2020    TELEHEALTH EVALUATION -- Audio/Visual (During HRQGG-90 public health emergency)    Brittanie Ansari (:  1980) has requested an audio/video evaluation for the following concern(s):    Chief Complaint   Patient presents with    Anxiety     patient would like to discuss anxiet attacks from wearing masks at work. HPI   Anxiety  Has been feeling anxious with wearing a mask at work. 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 Symptoms started to worsen last Friday, and lasted for 10 minutes. She works directly with the public. She does stand behind a shield. She is requesting a letter for her employer allowing her to work without wearing a mask. She has the following anxiety symptoms: panic attacks, racing thoughts and shortness of breath with increased breathing. Onset of symptoms was approximately a few weeks ago. Symptoms have been gradually worsening since that time. She denies current suicidal and homicidal ideation. Previous treatment includes: Wellbutrin. She complains of the following medication side effects: none. BP Readings from Last 3 Encounters:   19 124/74   19 138/88   19 126/84        Pulse Readings from Last 3 Encounters:   19 72   19 101   19 73        Wt Readings from Last 3 Encounters:   20 (!) 337 lb (152.9 kg)   19 (!) 331 lb 9.6 oz (150.4 kg)   19 (!) 322 lb (146.1 kg)        No Known Allergies     No past medical history on file.      Past Surgical History:   Procedure Laterality Date     SECTION  2008     SECTION  2013    with tubal    DILATION AND CURETTAGE OF UTERUS  2007    for SAB    TUBAL LIGATION  2013    with     WRIST SURGERY Right         Social History     Tobacco Use    Smoking status: Former Smoker    Smokeless tobacco: Never Used   Substance Use Topics    Alcohol use: No    Drug use: No       Prior to Visit Medications

## 2020-04-23 ASSESSMENT — ENCOUNTER SYMPTOMS
GASTROINTESTINAL NEGATIVE: 1
DIARRHEA: 0
ABDOMINAL PAIN: 0
NAUSEA: 0

## 2020-06-23 RX ORDER — LIRAGLUTIDE 6 MG/ML
INJECTION, SOLUTION SUBCUTANEOUS
Qty: 3 ML | Refills: 0 | Status: SHIPPED | OUTPATIENT
Start: 2020-06-23 | End: 2020-07-08

## 2020-07-07 NOTE — TELEPHONE ENCOUNTER
Shayne Munoz is calling to request a refill on the following medication(s):  Requested Prescriptions     Pending Prescriptions Disp Refills    SAXENDA 18 MG/3ML SOPN [Pharmacy Med Name: Merrill Jose Angel 18 MG/3ML Subcutaneous Solution Pen-injector] 3 mL 0     Sig: INJECT 0.6 MG SUBCUTANEOUSLY ONCE DAILY FOR 7 DAY. INCREASE DOSE BY 0.6MG EVERY WEEK UNTIL MAX DOSE OF 3MG.        Last Visit Date (If Applicable):  5/42/7677    Next Visit Date:    Visit date not found

## 2020-07-08 RX ORDER — LIRAGLUTIDE 6 MG/ML
INJECTION, SOLUTION SUBCUTANEOUS
Qty: 3 ML | Refills: 0 | Status: SHIPPED | OUTPATIENT
Start: 2020-07-08 | End: 2022-08-24

## 2020-08-17 RX ORDER — HYDROCHLOROTHIAZIDE 25 MG/1
TABLET ORAL
Qty: 60 TABLET | Refills: 0 | Status: SHIPPED | OUTPATIENT
Start: 2020-08-17 | End: 2020-10-20

## 2020-08-17 RX ORDER — BUSPIRONE HYDROCHLORIDE 5 MG/1
TABLET ORAL
Qty: 180 TABLET | Refills: 0 | Status: SHIPPED | OUTPATIENT
Start: 2020-08-17 | End: 2020-12-15 | Stop reason: SDUPTHER

## 2020-08-17 RX ORDER — MELOXICAM 15 MG/1
TABLET ORAL
Qty: 60 TABLET | Refills: 0 | Status: SHIPPED | OUTPATIENT
Start: 2020-08-17 | End: 2020-10-20

## 2020-08-17 NOTE — TELEPHONE ENCOUNTER
Sarah Garcia is requesting a refill on the following medication(s):  Requested Prescriptions     Pending Prescriptions Disp Refills    busPIRone (BUSPAR) 5 MG tablet [Pharmacy Med Name: busPIRone HCl 5 MG Oral Tablet] 180 tablet 0     Sig: TAKE 1 TABLET BY MOUTH THREE TIMES DAILY AS NEEDED FOR ANXIETY    hydroCHLOROthiazide (HYDRODIURIL) 25 MG tablet [Pharmacy Med Name: hydroCHLOROthiazide 25 MG Oral Tablet] 60 tablet 0     Sig: Take 1 tablet by mouth once daily    meloxicam (MOBIC) 15 MG tablet [Pharmacy Med Name: Meloxicam 15 MG Oral Tablet] 60 tablet 0     Sig: Take 1 tablet by mouth once daily       Last Visit Date (If Applicable):  5/92/7738    Next Visit Date:    Visit date not found

## 2020-10-20 RX ORDER — TIZANIDINE HYDROCHLORIDE 4 MG/1
CAPSULE, GELATIN COATED ORAL
Qty: 90 CAPSULE | Refills: 0 | Status: SHIPPED | OUTPATIENT
Start: 2020-10-20

## 2020-10-20 NOTE — TELEPHONE ENCOUNTER
Michaeleen Schaumann is requesting a refill on the following medication(s):  Requested Prescriptions     Pending Prescriptions Disp Refills    tiZANidine (ZANAFLEX) 4 MG capsule [Pharmacy Med Name: tiZANidine HCl 4 MG Oral Capsule] 90 capsule 0     Sig: TAKE 1 CAPSULE BY MOUTH THREE TIMES DAILY AS NEEDED FOR MUSCLE SPASMS       Last Visit Date (If Applicable):  9/22/9558    Next Visit Date:    Visit date not found

## 2020-12-15 RX ORDER — HYDROCHLOROTHIAZIDE 25 MG/1
25 TABLET ORAL DAILY
Qty: 30 TABLET | Refills: 0 | Status: SHIPPED | OUTPATIENT
Start: 2020-12-15 | End: 2021-03-13

## 2020-12-15 RX ORDER — BUSPIRONE HYDROCHLORIDE 5 MG/1
5 TABLET ORAL 3 TIMES DAILY PRN
Qty: 90 TABLET | Refills: 0 | Status: SHIPPED | OUTPATIENT
Start: 2020-12-15 | End: 2021-03-13

## 2020-12-15 RX ORDER — MELOXICAM 15 MG/1
15 TABLET ORAL DAILY
Qty: 30 TABLET | Refills: 0 | Status: SHIPPED | OUTPATIENT
Start: 2020-12-15 | End: 2021-03-13

## 2020-12-15 NOTE — TELEPHONE ENCOUNTER
Cheri Arzola is calling to request a refill on the following medication(s):  Requested Prescriptions     Pending Prescriptions Disp Refills    meloxicam (MOBIC) 15 MG tablet 60 tablet 1     Sig: Take 1 tablet by mouth daily    busPIRone (BUSPAR) 5 MG tablet 60 tablet 1     Sig: Take 1 tablet by mouth 3 times daily as needed (for anxiety)    hydroCHLOROthiazide (HYDRODIURIL) 25 MG tablet 60 tablet 1     Sig: Take 1 tablet by mouth daily       Last Visit Date (If Applicable):  8/05/5091    Next Visit Date:    Visit date not found

## 2020-12-16 RX ORDER — BUSPIRONE HYDROCHLORIDE 5 MG/1
TABLET ORAL
Qty: 180 TABLET | Refills: 0 | OUTPATIENT
Start: 2020-12-16

## 2020-12-16 RX ORDER — HYDROCHLOROTHIAZIDE 25 MG/1
TABLET ORAL
Qty: 60 TABLET | Refills: 0 | OUTPATIENT
Start: 2020-12-16

## 2020-12-16 RX ORDER — MELOXICAM 15 MG/1
TABLET ORAL
Qty: 60 TABLET | Refills: 0 | OUTPATIENT
Start: 2020-12-16

## 2021-06-18 DIAGNOSIS — I10 ESSENTIAL HYPERTENSION: ICD-10-CM

## 2021-06-18 DIAGNOSIS — M79.672 CHRONIC FOOT PAIN, LEFT: ICD-10-CM

## 2021-06-18 DIAGNOSIS — G89.29 CHRONIC FOOT PAIN, LEFT: ICD-10-CM

## 2021-06-21 RX ORDER — MELOXICAM 15 MG/1
TABLET ORAL
Qty: 30 TABLET | Refills: 0 | OUTPATIENT
Start: 2021-06-21

## 2021-06-21 RX ORDER — HYDROCHLOROTHIAZIDE 25 MG/1
TABLET ORAL
Qty: 30 TABLET | Refills: 0 | OUTPATIENT
Start: 2021-06-21

## 2021-12-24 DIAGNOSIS — F41.9 ANXIETY: ICD-10-CM

## 2021-12-27 RX ORDER — BUSPIRONE HYDROCHLORIDE 5 MG/1
TABLET ORAL
Qty: 90 TABLET | Refills: 0 | OUTPATIENT
Start: 2021-12-27

## 2021-12-27 NOTE — TELEPHONE ENCOUNTER
Ivone Escobar is requesting a refill on the following medication(s):  Requested Prescriptions     Pending Prescriptions Disp Refills    busPIRone (BUSPAR) 5 MG tablet [Pharmacy Med Name: busPIRone HCl 5 MG Oral Tablet] 90 tablet 0     Sig: TAKE 1 TABLET BY MOUTH THREE TIMES DAILY AS NEEDED FOR ANXIETY       Last Visit Date (If Applicable):  0/93/0670    Next Visit Date:    Visit date not found

## 2022-08-24 ENCOUNTER — OFFICE VISIT (OUTPATIENT)
Dept: FAMILY MEDICINE CLINIC | Age: 42
End: 2022-08-24
Payer: COMMERCIAL

## 2022-08-24 VITALS
OXYGEN SATURATION: 95 % | DIASTOLIC BLOOD PRESSURE: 84 MMHG | BODY MASS INDEX: 52.47 KG/M2 | HEART RATE: 72 BPM | WEIGHT: 293 LBS | SYSTOLIC BLOOD PRESSURE: 124 MMHG

## 2022-08-24 DIAGNOSIS — N63.10 BREAST MASS, RIGHT: Primary | ICD-10-CM

## 2022-08-24 DIAGNOSIS — F41.9 ANXIETY: ICD-10-CM

## 2022-08-24 DIAGNOSIS — Z12.31 ENCOUNTER FOR SCREENING MAMMOGRAM FOR BREAST CANCER: ICD-10-CM

## 2022-08-24 DIAGNOSIS — I10 ESSENTIAL HYPERTENSION: ICD-10-CM

## 2022-08-24 PROBLEM — N97.9 INFERTILITY, FEMALE: Status: RESOLVED | Noted: 2018-02-06 | Resolved: 2022-08-24

## 2022-08-24 PROCEDURE — 99213 OFFICE O/P EST LOW 20 MIN: CPT | Performed by: NURSE PRACTITIONER

## 2022-08-24 RX ORDER — NALTREXONE HYDROCHLORIDE 50 MG/1
TABLET, FILM COATED ORAL 2 TIMES DAILY
COMMUNITY
Start: 2022-08-17

## 2022-08-24 RX ORDER — TOPIRAMATE 100 MG/1
CAPSULE, EXTENDED RELEASE ORAL
COMMUNITY
Start: 2022-06-20

## 2022-08-24 RX ORDER — ERGOCALCIFEROL 1.25 MG/1
50000 CAPSULE ORAL WEEKLY
COMMUNITY

## 2022-08-24 RX ORDER — OMEPRAZOLE 10 MG/1
10 CAPSULE, DELAYED RELEASE ORAL DAILY
COMMUNITY

## 2022-08-24 RX ORDER — METFORMIN HYDROCHLORIDE 500 MG/1
1000 TABLET, EXTENDED RELEASE ORAL
COMMUNITY

## 2022-08-24 NOTE — PROGRESS NOTES
[address] [address] [address]  Dept: [phone]  Dept Fax: [phone]    History and Physical  Patient:  Joaquín Hauser  YOB: 1980  Date of Service:  2022    Subjective:   Joaquín Hauser (:  1980) is a 36 y.o. female, Established patient, here for evaluation of the following chief complaint(s):    Chief Complaint   Patient presents with    Arm Pain     C/o pain in right armpit, found a lump in armpit. Has family hx of breast cancer and reports having pain with numbness in right arm, tender to touch, area is size of a half dollar      Found an area 2 months ago under the right arm while doing a breast exam. The lump is gradually worsening. She notices it more when the arm is in a certain position. Sometimes has numbness and sharp shooting pain when touched. Pain rated 10/10 at times. Mass  This is a new problem. The current episode started more than 1 month ago. The problem occurs daily. The problem has been gradually worsening. Associated symptoms include numbness. Pertinent negatives include no chest pain, chills, coughing, fatigue, fever, headaches, nausea, sore throat, swollen glands or vomiting. Exacerbated by: touching it. She has tried nothing for the symptoms. The treatment provided no relief.        The ASCVD Risk score (Dada Azul, et al., 2013) failed to calculate for the following reasons:    Cannot find a previous HDL lab    Cannot find a previous total cholesterol lab     BP Readings from Last 3 Encounters:   22 124/84   19 124/74   19 138/88      Pulse Readings from Last 3 Encounters:   22 72   19 72   19 101      Wt Readings from Last 3 Encounters:   22 (!) 330 lb (149.7 kg)   20 (!) 337 lb (152.9 kg)   19 (!) 331 lb 9.6 oz (150.4 kg)        No Known Allergies    Current Outpatient Medications   Medication Sig Dispense Refill    naltrexone throat. Respiratory:  Negative for cough, shortness of breath and wheezing. Cardiovascular:  Negative for chest pain. Gastrointestinal:  Negative for nausea and vomiting. Skin:         Mass right breast   Neurological:  Positive for numbness. Negative for headaches. Psychiatric/Behavioral:  The patient is nervous/anxious. PHQ Scores 9/23/2019 7/23/2018   PHQ2 Score 0 2   PHQ9 Score 0 2     Interpretation of Total Score Depression Severity: 1-4 = Minimal depression, 5-9 = Mild depression, 10-14 = Moderate depression, 15-19 = Moderately severe depression, 20-27 = Severe depression     Physical Exam:     Vitals:    08/24/22 1622   BP: 124/84   Site: Left Upper Arm   Position: Sitting   Cuff Size: Large Adult   Pulse: 72   SpO2: 95%   Weight: (!) 330 lb (149.7 kg)      Body mass index is 52.47 kg/m². Physical Exam  Constitutional:       Appearance: Normal appearance. She is obese. HENT:      Head: Normocephalic. Eyes:      Conjunctiva/sclera: Conjunctivae normal.   Cardiovascular:      Rate and Rhythm: Normal rate and regular rhythm. Heart sounds: Normal heart sounds. Pulmonary:      Effort: Pulmonary effort is normal.      Breath sounds: Normal breath sounds. No wheezing. Chest:      Chest wall: Mass (small palpable pea-size mass to outer right breast) present. Musculoskeletal:      Cervical back: Neck supple. Neurological:      Mental Status: She is alert and oriented to person, place, and time. Psychiatric:         Mood and Affect: Mood is anxious. Assessment/Plan:   1. Breast mass, right  -     US BREAST LIMITED RIGHT; Future  2. Anxiety  3. Essential hypertension  4. Encounter for screening mammogram for breast cancer  -     MAGI DIGITAL SCREEN W OR WO CAD BILATERAL; Future      All patient questions answered. Patient voiced understanding. Instructed to continue current medications. Patient agreed with treatment plan. Follow up as directed.      Return if symptoms worsen or fail to improve. Please note that this chart was generated using voice recognition Dragon dictation software. Although every effort was made to ensure the accuracy of this automated transcription, some errors in transcription may have occurred.     Electronically signed by JAMI Reaves CNP on 9/7/2022

## 2022-08-29 ENCOUNTER — TELEPHONE (OUTPATIENT)
Dept: FAMILY MEDICINE CLINIC | Age: 42
End: 2022-08-29

## 2022-08-29 DIAGNOSIS — N63.10 BREAST MASS, RIGHT: Primary | ICD-10-CM

## 2022-08-30 ENCOUNTER — TELEPHONE (OUTPATIENT)
Dept: FAMILY MEDICINE CLINIC | Age: 42
End: 2022-08-30

## 2022-08-30 NOTE — TELEPHONE ENCOUNTER
Called patient to review results. Left message on voicemail and recommend she check [employer] for detailed message regarding the results. Encouraged to call with additional questions or concerns.

## 2022-09-07 ENCOUNTER — TELEPHONE (OUTPATIENT)
Dept: FAMILY MEDICINE CLINIC | Age: 42
End: 2022-09-07

## 2022-09-07 PROBLEM — F41.9 ANXIETY: Status: ACTIVE | Noted: 2022-09-07

## 2022-09-07 PROBLEM — N63.10 BREAST MASS, RIGHT: Status: ACTIVE | Noted: 2022-09-07

## 2022-09-07 ASSESSMENT — ENCOUNTER SYMPTOMS
WHEEZING: 0
VOMITING: 0
COUGH: 0
NAUSEA: 0
SORE THROAT: 0
SWOLLEN GLANDS: 0
SHORTNESS OF BREATH: 0

## 2022-09-07 NOTE — TELEPHONE ENCOUNTER
Left vm for pt that detailed results were sent via [employer].  Any questions could be addressed through [employer] or by phoning office

## 2022-09-07 NOTE — TELEPHONE ENCOUNTER
Victor Manuel Zamora called  questioning her US results, said lump is still there and would like a call back.

## 2022-09-08 DIAGNOSIS — N63.10 BREAST MASS, RIGHT: Primary | ICD-10-CM

## 2022-09-08 DIAGNOSIS — R59.9 ENLARGEMENT OF LYMPH NODE: ICD-10-CM

## 2022-09-15 DIAGNOSIS — G44.89 HEADACHE SYNDROME: Primary | ICD-10-CM

## 2022-09-15 NOTE — TELEPHONE ENCOUNTER
Shonda Sargent is calling to request a refill on the following medication(s):  Requested Prescriptions     Pending Prescriptions Disp Refills    Topiramate  MG CS24 90 each 1     Sig: Take 100 mg by mouth daily       Last Visit Date (If Applicable):  2/25/6678    Next Visit Date:    Visit date not found

## 2022-09-16 RX ORDER — TOPIRAMATE 100 MG/1
100 CAPSULE, EXTENDED RELEASE ORAL DAILY
Qty: 90 EACH | Refills: 1 | OUTPATIENT
Start: 2022-09-16

## 2022-09-17 DIAGNOSIS — F41.9 ANXIETY: ICD-10-CM

## 2022-09-19 NOTE — TELEPHONE ENCOUNTER
Rambo Cedillo is requesting a refill on the following medication(s):  Requested Prescriptions     Pending Prescriptions Disp Refills    busPIRone (BUSPAR) 5 MG tablet 90 tablet 0     Sig: Take 1 tablet by mouth 3 times daily       Last Visit Date (If Applicable):  4/83/9697    Next Visit Date:    Visit date not found

## 2022-09-20 RX ORDER — BUSPIRONE HYDROCHLORIDE 5 MG/1
5 TABLET ORAL 3 TIMES DAILY
Qty: 90 TABLET | Refills: 0 | Status: SHIPPED | OUTPATIENT
Start: 2022-09-20

## 2022-09-28 DIAGNOSIS — N63.10 BREAST MASS, RIGHT: ICD-10-CM

## 2022-09-28 DIAGNOSIS — R59.9 ENLARGEMENT OF LYMPH NODE: ICD-10-CM
